# Patient Record
Sex: MALE
[De-identification: names, ages, dates, MRNs, and addresses within clinical notes are randomized per-mention and may not be internally consistent; named-entity substitution may affect disease eponyms.]

---

## 2023-05-01 ENCOUNTER — APPOINTMENT (OUTPATIENT)
Dept: INTERNAL MEDICINE | Facility: CLINIC | Age: 73
End: 2023-05-01

## 2023-05-03 ENCOUNTER — NON-APPOINTMENT (OUTPATIENT)
Age: 73
End: 2023-05-03

## 2023-05-03 ENCOUNTER — APPOINTMENT (OUTPATIENT)
Dept: INTERNAL MEDICINE | Facility: CLINIC | Age: 73
End: 2023-05-03
Payer: MEDICARE

## 2023-05-03 VITALS
OXYGEN SATURATION: 100 % | TEMPERATURE: 98.2 F | HEIGHT: 70 IN | WEIGHT: 163 LBS | BODY MASS INDEX: 23.34 KG/M2 | HEART RATE: 68 BPM

## 2023-05-03 VITALS — SYSTOLIC BLOOD PRESSURE: 122 MMHG | DIASTOLIC BLOOD PRESSURE: 72 MMHG

## 2023-05-03 DIAGNOSIS — I50.20 UNSPECIFIED SYSTOLIC (CONGESTIVE) HEART FAILURE: ICD-10-CM

## 2023-05-03 DIAGNOSIS — Z95.0 PRESENCE OF CARDIAC PACEMAKER: ICD-10-CM

## 2023-05-03 DIAGNOSIS — K27.9 PEPTIC ULCER, SITE UNSPECIFIED, UNSPECIFIED AS ACUTE OR CHRONIC, W/OUT HEMORRHAGE OR PERFORATION: ICD-10-CM

## 2023-05-03 DIAGNOSIS — Z95.810 PRESENCE OF AUTOMATIC (IMPLANTABLE) CARDIAC DEFIBRILLATOR: ICD-10-CM

## 2023-05-03 DIAGNOSIS — Z00.00 ENCOUNTER FOR GENERAL ADULT MEDICAL EXAMINATION W/OUT ABNORMAL FINDINGS: ICD-10-CM

## 2023-05-03 PROCEDURE — 36415 COLL VENOUS BLD VENIPUNCTURE: CPT

## 2023-05-03 PROCEDURE — 99213 OFFICE O/P EST LOW 20 MIN: CPT | Mod: 25

## 2023-05-03 PROCEDURE — G0438: CPT

## 2023-05-03 NOTE — PHYSICAL EXAM
[Normal] : soft, non-tender, non-distended, no masses palpated, no HSM and normal bowel sounds [de-identified] : some rigidity, masked facies, LUE tremor

## 2023-05-03 NOTE — HISTORY OF PRESENT ILLNESS
[Family Member] : family member [FreeTextEntry1] : to establish\par seen with sister [de-identified] : seen with sister\par prior 20+py tobacco d/c 10/22\par ashd with stents, icd/ppm placement--Dr Monet[facility]\par electro--dr. Pina\par advised to see endo and neuro but didn’t do so--tremor\par had bleeding PUD 12/22--inpt\par brings labs in from 2/23-reviewed\par lives with girlfriend

## 2023-05-03 NOTE — HEALTH RISK ASSESSMENT
[Patient reported colonoscopy was normal] : Patient reported colonoscopy was normal [ColonoscopyDate] : 1/21

## 2023-05-04 LAB
ANION GAP SERPL CALC-SCNC: 16 MMOL/L
BUN SERPL-MCNC: 20 MG/DL
CALCIUM SERPL-MCNC: 9.6 MG/DL
CHLORIDE SERPL-SCNC: 100 MMOL/L
CO2 SERPL-SCNC: 21 MMOL/L
CREAT SERPL-MCNC: 0.98 MG/DL
EGFR: 83 ML/MIN/1.73M2
ESTIMATED AVERAGE GLUCOSE: 240 MG/DL
GLUCOSE SERPL-MCNC: 247 MG/DL
HBA1C MFR BLD HPLC: 10 %
HCT VFR BLD CALC: 45.6 %
HCV AB SER QL: NONREACTIVE
HCV S/CO RATIO: 0.09 S/CO
HGB BLD-MCNC: 14.7 G/DL
MCHC RBC-ENTMCNC: 28.2 PG
MCHC RBC-ENTMCNC: 32.2 GM/DL
MCV RBC AUTO: 87.4 FL
PLATELET # BLD AUTO: 218 K/UL
POTASSIUM SERPL-SCNC: 4 MMOL/L
RBC # BLD: 5.22 M/UL
RBC # FLD: 15 %
SODIUM SERPL-SCNC: 138 MMOL/L
TSH SERPL-ACNC: 1.34 UIU/ML
WBC # FLD AUTO: 7.14 K/UL

## 2023-05-10 ENCOUNTER — NON-APPOINTMENT (OUTPATIENT)
Age: 73
End: 2023-05-10

## 2023-05-24 ENCOUNTER — APPOINTMENT (OUTPATIENT)
Dept: NEUROLOGY | Facility: CLINIC | Age: 73
End: 2023-05-24
Payer: MEDICARE

## 2023-05-24 VITALS
BODY MASS INDEX: 22.33 KG/M2 | DIASTOLIC BLOOD PRESSURE: 78 MMHG | WEIGHT: 156 LBS | HEART RATE: 70 BPM | HEIGHT: 70 IN | RESPIRATION RATE: 15 BRPM | OXYGEN SATURATION: 100 % | SYSTOLIC BLOOD PRESSURE: 108 MMHG | TEMPERATURE: 98 F

## 2023-05-24 PROCEDURE — 99205 OFFICE O/P NEW HI 60 MIN: CPT

## 2023-05-24 RX ORDER — LISINOPRIL 10 MG/1
10 TABLET ORAL
Refills: 0 | Status: ACTIVE | COMMUNITY

## 2023-05-24 RX ORDER — ATORVASTATIN CALCIUM 40 MG/1
40 TABLET, FILM COATED ORAL
Refills: 0 | Status: ACTIVE | COMMUNITY

## 2023-05-24 RX ORDER — CLOPIDOGREL BISULFATE 75 MG/1
75 TABLET, FILM COATED ORAL
Refills: 0 | Status: ACTIVE | COMMUNITY

## 2023-05-24 RX ORDER — AMLODIPINE BESYLATE 5 MG/1
5 TABLET ORAL
Refills: 0 | Status: ACTIVE | COMMUNITY

## 2023-05-24 RX ORDER — METOPROLOL TARTRATE 100 MG/1
100 TABLET, FILM COATED ORAL
Refills: 0 | Status: ACTIVE | COMMUNITY

## 2023-05-24 NOTE — HISTORY OF PRESENT ILLNESS
[FreeTextEntry1] : HPI (initial visit May 24, 2023)- NI FOX is a 72 year old left handed man w/ hx of CAD s/p stent, CHF s/p ICD, DM, HTN, Peptic ulcer disease, referred by Dr. Adrian contreras for tremor evaluation and to rule out PD. \par \par He is accompanied by sister. He lives with girlfriend, Sejal. \par \par Tremors in hands since at least . Left hand tremors at rest. Milder right hand tremors.  More noticeable after PM placement. Hand shakes with activities too. \par Balance has been off for the past few months. no falls. "Has to think to stand up". "Does not come naturally". Does better when he has seats with handles.\par Shuffles gait- x 1 year. + Festinating gait.\par Mumbles- sister attributes this to loosing his teeth.\par No constipation.\par Short term memory loss.\par Sense of smell is good.\par No hx of dream enactment. No vivid dreams.\par No head trauma or seizures. No hx of anti psychotic therapy. \par No hx of anxiety or depression. \par \par No known family hx of PD, Tremors or dementia. Parents  in their 60's (father had emphysema and mother had breast cancer). Sister had rectal cancer. \par

## 2023-05-24 NOTE — ASSESSMENT
[FreeTextEntry1] : Assessment/Plan:\par  72 year old left handed male w/ hx of resting tremors since 2016 (L>R) and shuffling gait with gait imbalance since 2022, referred for evaluation of possible Parkinsons disease. \par \par His clinical history and neurological exam are suggestive of idiopathic Parkinson disease. \par Counselled both patient and sister on the diagnosis, its pathophysiology and management. \par I will plan on starting him on low dose sinemet, reviewed side effects in detail.\par \par Sinemet  1 tab TID- titration schedule discussed. \par - 1 tab in evening x 1 week, then 1 tab noon and evening x 1 week and then 1 tab TID.\par \par Fall precautions discussed\par \par \par Return to clinic 2 months, will also refer to movement disorder specialist at [facility]. \par \par The above plan was discussed with NI FOX in great detail.  NI FOX verbalized understanding and agrees with plan as detailed above. Patient was provided education and counselling on current diagnosis/symptoms. He was advised to call our clinic at [phone] for any new or worsening symptoms, or with any questions or concerns. In case of acute onset of neurological symptoms or worsening presentation, patient was advised to present to nearest emergency room for further evaluation. NI FOX expressed understanding and all his questions/concerns were addressed.\par \par Lakisha Abdalla M.D\par

## 2023-05-24 NOTE — PHYSICAL EXAM
[FreeTextEntry1] : NEUROLOGICAL EXAM\par Mental status: The patient is alert, attentive, and oriented x 3.\par Speech: clear and fluent with good repetition, comprehension, and naming. Recalls 1/3 objects at 5 minutes.\par Cranial nerves:\par CN II: Visual fields are full to confrontation. Pupil size equal and briskly reactive to light. \par CN III, IV, VI: EOMI, no nystagmus, no ptosis\par CN V: Facial sensation is intact to pinprick in all 3 divisions bilaterally.\par CN VII: Face is symmetric with normal eye closure and smile.\par CN VII: Hearing is normal to rubbing fingers\par CN IX, X: Palate elevates symmetrically. Phonation is normal.\par CN XI: Head turning and shoulder shrug are intact\par CN XII: Tongue is midline with normal movements and no atrophy.\par Motor: There is no pronator drift of out-stretched arms. Muscle bulk. Strength is full bilaterally. \par 5/5 muscle power at bilat: Deltoid, Biceps, Triceps, Wrist ext, Finger abd, Hip flex, Hip ext, Knee flex, Knee ext, Ankle flex, Ankle ext\par Sensory: Light touch sensation intact UE and LE\par \par \par Movement disorders examination:\par Myerson sign: present\par Palmomental signs: present\par Speech and facial expression: + Hypomimia, + hypophonia \par Tremor: resting tremor b/l (pill rolling tremor), L>R. Mild postural and action tremors. \par Tone: mild b/l cogwheel rigidity at wrist\par Bradykinesia: Mildly noting on finger tapping on the right\par Gait: arm swing decreased on the right, mild shuffling gait, left hand pill rolling tremor, mild imbalance on turning. \par \par \par

## 2023-06-20 ENCOUNTER — APPOINTMENT (OUTPATIENT)
Dept: NEUROLOGY | Facility: CLINIC | Age: 73
End: 2023-06-20

## 2023-06-20 ENCOUNTER — APPOINTMENT (OUTPATIENT)
Dept: NEUROLOGY | Facility: CLINIC | Age: 73
End: 2023-06-20
Payer: MEDICARE

## 2023-06-20 PROCEDURE — 99215 OFFICE O/P EST HI 40 MIN: CPT

## 2023-06-20 RX ORDER — RASAGILINE 1 MG/1
1 TABLET ORAL DAILY
Qty: 90 | Refills: 3 | Status: ACTIVE | COMMUNITY
Start: 2023-06-20 | End: 1900-01-01

## 2023-06-20 RX ORDER — ASPIRIN 81 MG
81 TABLET, DELAYED RELEASE (ENTERIC COATED) ORAL
Refills: 0 | Status: DISCONTINUED | COMMUNITY
End: 2023-06-20

## 2023-06-21 NOTE — DISCUSSION/SUMMARY
[FreeTextEntry1] : 72 year old left handed male history of resting tremors since 2016 (L>R) and shuffling gait with gait imbalance since 2022. Patient started LD about three weeks ago, denies adverse effects but does not notice shuffling gait since starting the medication. On exam he is bradykinetic in his hand movements. He reportedly maintains independence with ALDs and does not fall. \par  \par Patient was counseled on the following recommendations\par \par -Start Rasagiline 1mg qd. AEs reviewed\par -Continue Sinemet 1 tab TID\par -Refer to PT \par -Encouraged to increase exercise and physical activity and maintain an active social and intellectual life.\par \par fu in 6 weeks with me \par f/u with movement disorders specialist

## 2023-06-21 NOTE — PHYSICAL EXAM
[FreeTextEntry1] : + 1 Facial hypomimia, reduced blink rate\par Vertical eye movements intact without square wave jerks\par +1 Hypophonic speech\par +1 Rigidity of neck rotation\par +2 L>R Rigidity of limbs present with contralateral activation \par +2 left arm resting tremor\par +1 left action tremor\par 0 Postural tremor \par +2 Bradykinesia with finger tapping, hand supination/pronation, foot tapping, foot stomping.\par No dysmetria with finger to nose\par Gait: able to stand with hands crossed and without assistance slowly\par Flexed posture\par Slow gait initiation, decreased stride length, reduced arm swing, NO freezing of gait upon turning, requires two steps with turning.\par Able to walk on heels and toes\par Negative retropulsion test\par \par

## 2023-07-04 ENCOUNTER — FORM ENCOUNTER (OUTPATIENT)
Age: 73
End: 2023-07-04

## 2023-07-12 ENCOUNTER — APPOINTMENT (OUTPATIENT)
Dept: NEUROLOGY | Facility: CLINIC | Age: 73
End: 2023-07-12
Payer: MEDICARE

## 2023-07-12 VITALS
WEIGHT: 156 LBS | HEART RATE: 79 BPM | HEIGHT: 70 IN | DIASTOLIC BLOOD PRESSURE: 78 MMHG | BODY MASS INDEX: 22.33 KG/M2 | OXYGEN SATURATION: 99 % | SYSTOLIC BLOOD PRESSURE: 110 MMHG

## 2023-07-12 PROCEDURE — 99214 OFFICE O/P EST MOD 30 MIN: CPT

## 2023-07-12 NOTE — HISTORY OF PRESENT ILLNESS
[FreeTextEntry1] : INTERIM HX 2023: He is on sinemet 1 tab TID. Seen in movement disorder order clinic 2023 and started on rasagiline 1mg QD, just started yesterday, and referred to PT. \par No side effects on Sinemet. Tremors are better and walking faster. When getting up out of chair he feels more comfortable, "no longer unsure", feels more coordinated. He has not been paying attention to timing of symptom improvement after taking meds. \par Sinemet 8 am, 1 pm, 5 pm. \par ----------------------------------------------------------------------------------------------------------------------------------------\par HPI (initial visit May 24, 2023)- NI FOX is a 72 year old left handed man w/ hx of CAD s/p stent, CHF s/p ICD, DM, HTN, Peptic ulcer disease, referred by Dr. Adrian contreras for tremor evaluation and to rule out PD. \par \par He is accompanied by sister. He lives with girlfriend, Sejal. \par \par Tremors in hands since at least 2016. Left hand tremors at rest. Milder right hand tremors.  More noticeable after PM placement. Hand shakes with activities too. \par Balance has been off for the past few months. no falls. "Has to think to stand up". "Does not come naturally". Does better when he has seats with handles.\par Shuffles gait- x 1 year. + Festinating gait.\par Mumbles- sister attributes this to loosing his teeth.\par No constipation.\par Short term memory loss.\par Sense of smell is good.\par No hx of dream enactment. No vivid dreams.\par No head trauma or seizures. No hx of anti psychotic therapy. \par No hx of anxiety or depression. \par \par No known family hx of PD, Tremors or dementia. Parents  in their 60's (father had emphysema and mother had breast cancer). Sister had rectal cancer. \par

## 2023-07-12 NOTE — ASSESSMENT
[FreeTextEntry1] : Assessment/Plan:\par  72 year old left handed male w/ hx of resting tremors since 2016 (L>R) and shuffling gait with gait imbalance since 2022, recently now diagnosed with idiopathic Parkinson disease 5/2023.\par \par He was started on Sinemet after our last visit, 5/24/2023, and was started on rasagaline 1 mg QD 7/11/2023 by Fawn Giordano NP. \par \par Gait has improved, tremors with mild improvement. \par \par Continue Sinemet  1 tab TID and rasagiline 1mg QD\par Agree with PT referral\par Fall precautions discussed\par \par Continue to follow up with movement disorder clinic, scheduled to see Fawn Giordano 8/1/2023\par \par The above plan was discussed with NI FOX in great detail. NI FOX verbalized understanding and agrees with plan as detailed above. Patient was provided education and counselling on current diagnosis/symptoms. He was advised to call our clinic at [phone] for any new or worsening symptoms, or with any questions or concerns. In case of acute onset of neurological symptoms or worsening presentation, patient was advised to present to nearest emergency room for further evaluation. NI FOX expressed understanding and all his questions/concerns were addressed.\par \par Lakisha Abdalla M.D

## 2023-07-12 NOTE — PHYSICAL EXAM
[FreeTextEntry1] : Myerson sign: present\par Palmomental signs: present\par Alert, attentive, and oriented x 3.\par + Hypomimia, + hypophonia \par EOMI, no nystagmus, no ptosis\par There is no pronator drift of out-stretched arms. Muscle bulk. Strength is full bilaterally. \par Resting tremor b/l (pill rolling tremor), L>R. Mild postural and action tremors. \par mild b/l cogwheel rigidity at wrist\par Mildly noting on finger tapping on the L>R\par No shuffling gait noted. Mild decreased arm swing R. Re emergent tremors\par \par

## 2023-08-01 ENCOUNTER — APPOINTMENT (OUTPATIENT)
Dept: NEUROLOGY | Facility: CLINIC | Age: 73
End: 2023-08-01
Payer: MEDICARE

## 2023-08-01 VITALS
SYSTOLIC BLOOD PRESSURE: 110 MMHG | HEIGHT: 70 IN | WEIGHT: 164 LBS | BODY MASS INDEX: 23.48 KG/M2 | HEART RATE: 53 BPM | DIASTOLIC BLOOD PRESSURE: 73 MMHG

## 2023-08-01 PROCEDURE — 99215 OFFICE O/P EST HI 40 MIN: CPT

## 2023-08-01 NOTE — DISCUSSION/SUMMARY
[FreeTextEntry1] : 72-year-old left handed male with history of resting tremors since 2016 (L>R) and shuffling gait with gait imbalance since 2022. Patient continues to be independent with ADLs but desires better tremor control.    Patient was counseled on the following recommendations:   -Continue rasagaline -increase Sinemet to 1.5 tabs TID -continue PT  -Encouraged to increase exercise and physical activity and maintain an active social and intellectual life.  fu in 2 months f/u with movement disorders specialist

## 2023-08-01 NOTE — HISTORY OF PRESENT ILLNESS
[FreeTextEntry1] : 72 year old   male patient was diagnosed with Parkinson's disease in 2023. Reports a rest tremor more on the left.  He reports some shuffling gait and slow to initiate gait. Does not need assistance with ADLs. Lately has not been driving because he has some visual issues. Denies falls, denies feeling lightheaded.    Since the last visit he experienced delays in obtaining rasagaline due to insurance issues. He has only been taking it for 2-3 weeks now. He reports trouble getting out of a deep chair. He does not need assistance with anything. He feels his gait has improved overall, no longer shuffles. He notices the right hand tremor most of the day and although it does not interfere with eating or self care, he finds it annoying. He started PT, now going 2x per week.  -No Anosmia -Sleeps about 8 hours a night. No RBD symptoms -BM qod  -Denies urinary issues  -No cognitive changes -Denies VH -No sialorrhea  Social Hx: retired . Lives with wife in [facility]. no children. Quit smoking in October 2022. Smoked for 50 years. PMHx: Pacemaker and defibrillator originally placed in 2016, DMII  Current medications: Carbidopa-Levodopa 25/100 8-1-6 Rasagaline 1mg qd

## 2023-08-01 NOTE — PHYSICAL EXAM
[General Appearance - Alert] : alert [General Appearance - In No Acute Distress] : in no acute distress [Oriented To Time, Place, And Person] : oriented to person, place, and time [Motor Strength] : muscle strength was normal in all four extremities [FreeTextEntry1] : + 1 Facial hypomimia, reduced blink rate Vertical eye movements intact without square wave jerks +1 Hypophonic speech +1 Rigidity of neck rotation +2 L>R Rigidity of limbs present  +2 left arm resting tremor +1 left action tremor 0 Postural tremor  +2 Bradykinesia with finger tapping, hand supination/pronation, foot tapping, foot stomping. No dysmetria with finger to nose Gait: able to stand with hands crossed and without assistance after 2 tries  Flexed posture Amanda gait initiation, normal stride length, reduced left arm swing, NO freezing of gait upon turning, requires two steps with turning. Able to walk on heels and toes Negative retropulsion test

## 2023-08-15 ENCOUNTER — RX RENEWAL (OUTPATIENT)
Age: 73
End: 2023-08-15

## 2023-08-31 ENCOUNTER — APPOINTMENT (OUTPATIENT)
Dept: INTERNAL MEDICINE | Facility: CLINIC | Age: 73
End: 2023-08-31
Payer: MEDICARE

## 2023-08-31 VITALS
SYSTOLIC BLOOD PRESSURE: 116 MMHG | TEMPERATURE: 98.6 F | OXYGEN SATURATION: 77 % | HEART RATE: 222 BPM | HEIGHT: 70 IN | DIASTOLIC BLOOD PRESSURE: 73 MMHG | WEIGHT: 162 LBS | BODY MASS INDEX: 23.19 KG/M2

## 2023-08-31 DIAGNOSIS — R25.1 TREMOR, UNSPECIFIED: ICD-10-CM

## 2023-08-31 PROCEDURE — 99214 OFFICE O/P EST MOD 30 MIN: CPT

## 2023-08-31 RX ORDER — BLOOD SUGAR DIAGNOSTIC
STRIP MISCELLANEOUS
Refills: 0 | Status: ACTIVE | COMMUNITY
Start: 2023-08-31

## 2023-08-31 RX ORDER — BLOOD-GLUCOSE METER
W/DEVICE EACH MISCELLANEOUS
Qty: 1 | Refills: 0 | Status: COMPLETED | COMMUNITY
Start: 2023-05-11 | End: 2023-08-31

## 2023-08-31 RX ORDER — LEVMETAMFETAMINE 50 MG
INHALER (EA) NASAL
Qty: 1 | Refills: 3 | Status: ACTIVE | COMMUNITY
Start: 2023-08-31 | End: 1900-01-01

## 2023-08-31 NOTE — HISTORY OF PRESENT ILLNESS
[Family Member] : family member [FreeTextEntry1] : f/u diabetes [de-identified] : seeing cardio ([facility]) meds same--movement better on parkinsons meds not checking glucoses consultant notes reviewed

## 2023-09-01 LAB
ALBUMIN SERPL ELPH-MCNC: 4.6 G/DL
ALP BLD-CCNC: 111 U/L
ALT SERPL-CCNC: 7 U/L
ANION GAP SERPL CALC-SCNC: 14 MMOL/L
AST SERPL-CCNC: 15 U/L
BILIRUB SERPL-MCNC: 1.1 MG/DL
BUN SERPL-MCNC: 20 MG/DL
CALCIUM SERPL-MCNC: 9.9 MG/DL
CHLORIDE SERPL-SCNC: 103 MMOL/L
CHOLEST SERPL-MCNC: 201 MG/DL
CO2 SERPL-SCNC: 23 MMOL/L
CREAT SERPL-MCNC: 0.95 MG/DL
EGFR: 86 ML/MIN/1.73M2
ESTIMATED AVERAGE GLUCOSE: 180 MG/DL
GLUCOSE SERPL-MCNC: 156 MG/DL
HBA1C MFR BLD HPLC: 7.9 %
HDLC SERPL-MCNC: 67 MG/DL
LDLC SERPL CALC-MCNC: 113 MG/DL
NONHDLC SERPL-MCNC: 134 MG/DL
POTASSIUM SERPL-SCNC: 4.4 MMOL/L
PROT SERPL-MCNC: 7.2 G/DL
SODIUM SERPL-SCNC: 140 MMOL/L
TRIGL SERPL-MCNC: 120 MG/DL

## 2023-10-03 ENCOUNTER — APPOINTMENT (OUTPATIENT)
Dept: NEUROLOGY | Facility: CLINIC | Age: 73
End: 2023-10-03
Payer: MEDICARE

## 2023-10-03 VITALS
DIASTOLIC BLOOD PRESSURE: 59 MMHG | HEIGHT: 70 IN | BODY MASS INDEX: 23.19 KG/M2 | HEART RATE: 43 BPM | SYSTOLIC BLOOD PRESSURE: 96 MMHG | WEIGHT: 162 LBS

## 2023-10-03 VITALS — HEART RATE: 57 BPM | SYSTOLIC BLOOD PRESSURE: 89 MMHG | DIASTOLIC BLOOD PRESSURE: 59 MMHG

## 2023-10-03 PROCEDURE — 99215 OFFICE O/P EST HI 40 MIN: CPT

## 2023-12-08 ENCOUNTER — APPOINTMENT (OUTPATIENT)
Dept: NEUROLOGY | Facility: CLINIC | Age: 73
End: 2023-12-08
Payer: MEDICARE

## 2023-12-08 VITALS
HEIGHT: 70 IN | WEIGHT: 162 LBS | HEART RATE: 51 BPM | BODY MASS INDEX: 23.19 KG/M2 | DIASTOLIC BLOOD PRESSURE: 73 MMHG | SYSTOLIC BLOOD PRESSURE: 125 MMHG

## 2023-12-08 DIAGNOSIS — K59.00 CONSTIPATION, UNSPECIFIED: ICD-10-CM

## 2023-12-08 PROCEDURE — 99215 OFFICE O/P EST HI 40 MIN: CPT

## 2023-12-08 RX ORDER — SENNOSIDES 8.6 MG TABLETS 8.6 MG/1
8.6 TABLET ORAL
Qty: 60 | Refills: 5 | Status: ACTIVE | COMMUNITY
Start: 2023-12-08 | End: 1900-01-01

## 2024-01-23 ENCOUNTER — APPOINTMENT (OUTPATIENT)
Dept: INTERNAL MEDICINE | Facility: CLINIC | Age: 74
End: 2024-01-23
Payer: MEDICARE

## 2024-01-23 VITALS
TEMPERATURE: 97 F | BODY MASS INDEX: 23.34 KG/M2 | OXYGEN SATURATION: 54 % | DIASTOLIC BLOOD PRESSURE: 51 MMHG | WEIGHT: 163 LBS | SYSTOLIC BLOOD PRESSURE: 100 MMHG | HEART RATE: 213 BPM | HEIGHT: 70 IN

## 2024-01-23 VITALS — DIASTOLIC BLOOD PRESSURE: 74 MMHG | SYSTOLIC BLOOD PRESSURE: 118 MMHG

## 2024-01-23 VITALS — HEART RATE: 71 BPM | OXYGEN SATURATION: 100 %

## 2024-01-23 DIAGNOSIS — G20.A1 PARKINSON'S DISEASE WITHOUT DYSKINESIA, WITHOUT MENTION OF FLUCTUATIONS: ICD-10-CM

## 2024-01-23 DIAGNOSIS — F17.201 NICOTINE DEPENDENCE, UNSPECIFIED, IN REMISSION: ICD-10-CM

## 2024-01-23 DIAGNOSIS — I10 ESSENTIAL (PRIMARY) HYPERTENSION: ICD-10-CM

## 2024-01-23 DIAGNOSIS — E11.9 TYPE 2 DIABETES MELLITUS W/OUT COMPLICATIONS: ICD-10-CM

## 2024-01-23 DIAGNOSIS — I25.10 ATHEROSCLEROTIC HEART DISEASE OF NATIVE CORONARY ARTERY W/OUT ANGINA PECTORIS: ICD-10-CM

## 2024-01-23 DIAGNOSIS — Z95.5 PRESENCE OF CORONARY ANGIOPLASTY IMPLANT AND GRAFT: ICD-10-CM

## 2024-01-23 PROCEDURE — G0296 VISIT TO DETERM LDCT ELIG: CPT

## 2024-01-23 PROCEDURE — 99214 OFFICE O/P EST MOD 30 MIN: CPT

## 2024-01-23 NOTE — HISTORY OF PRESENT ILLNESS
[Family Member] : family member [FreeTextEntry1] : seen with sister [de-identified] : stable seeing cardio ([facility]--[facility]) no new cardio--pulm symps as per pt and sister same meds

## 2024-01-24 LAB
ALBUMIN SERPL ELPH-MCNC: 4.2 G/DL
ALP BLD-CCNC: 115 U/L
ALT SERPL-CCNC: 13 U/L
ANION GAP SERPL CALC-SCNC: 11 MMOL/L
AST SERPL-CCNC: 18 U/L
BASOPHILS # BLD AUTO: 0.05 K/UL
BASOPHILS NFR BLD AUTO: 0.7 %
BILIRUB SERPL-MCNC: 0.9 MG/DL
BUN SERPL-MCNC: 19 MG/DL
CALCIUM SERPL-MCNC: 9 MG/DL
CHLORIDE SERPL-SCNC: 102 MMOL/L
CO2 SERPL-SCNC: 26 MMOL/L
CREAT SERPL-MCNC: 0.83 MG/DL
EGFR: 94 ML/MIN/1.73M2
EOSINOPHIL # BLD AUTO: 0.11 K/UL
EOSINOPHIL NFR BLD AUTO: 1.6 %
ESTIMATED AVERAGE GLUCOSE: 180 MG/DL
GLUCOSE SERPL-MCNC: 319 MG/DL
HBA1C MFR BLD HPLC: 7.9 %
HCT VFR BLD CALC: 47.2 %
HGB BLD-MCNC: 15.4 G/DL
IMM GRANULOCYTES NFR BLD AUTO: 0.6 %
LYMPHOCYTES # BLD AUTO: 2.26 K/UL
LYMPHOCYTES NFR BLD AUTO: 32.4 %
MAN DIFF?: NORMAL
MCHC RBC-ENTMCNC: 30.1 PG
MCHC RBC-ENTMCNC: 32.6 GM/DL
MCV RBC AUTO: 92.4 FL
MONOCYTES # BLD AUTO: 0.48 K/UL
MONOCYTES NFR BLD AUTO: 6.9 %
NEUTROPHILS # BLD AUTO: 4.04 K/UL
NEUTROPHILS NFR BLD AUTO: 57.8 %
PLATELET # BLD AUTO: 223 K/UL
POTASSIUM SERPL-SCNC: 3.8 MMOL/L
PROT SERPL-MCNC: 6.9 G/DL
RBC # BLD: 5.11 M/UL
RBC # FLD: 14.6 %
SODIUM SERPL-SCNC: 139 MMOL/L
WBC # FLD AUTO: 6.98 K/UL

## 2024-01-26 RX ORDER — METFORMIN ER 500 MG 500 MG/1
500 TABLET ORAL
Qty: 90 | Refills: 1 | Status: ACTIVE | COMMUNITY
Start: 2024-01-26 | End: 1900-01-01

## 2024-02-01 ENCOUNTER — NON-APPOINTMENT (OUTPATIENT)
Age: 74
End: 2024-02-01

## 2024-02-01 VITALS — BODY MASS INDEX: 23.77 KG/M2 | HEIGHT: 70 IN | WEIGHT: 166 LBS

## 2024-02-01 DIAGNOSIS — Z87.891 PERSONAL HISTORY OF NICOTINE DEPENDENCE: ICD-10-CM

## 2024-02-01 NOTE — REASON FOR VISIT
[Medical Office: ([facility])___] : at the medical office located in  [Verbal consent obtained from patient] : the patient, [unfilled] [Initial Evaluation] : an initial evaluation visit [Review of Eligibility] : review of eligibility [Low-Dose CT Screening Discussion] : low-dose CT lung cancer screening discussion [Virtual Visit] : virtual visit

## 2024-02-01 NOTE — HISTORY OF PRESENT ILLNESS
[Former] : Former [TextBox_13] : Referred by Dr. Mckinney  Mr. NI FOX is a 73 year old man with a history of nicotine dependence   Over the telephone today we reviewed and confirmed that the patient meets screening eligibility criteria:  -Age: 73 years old  Smoking status:  -Former smoker  -Number of pack(s) per day: 2  -Number of years smoked: 45  -Number of pack years smokin  -Number of years since quitting smokin  Mr. FOX denies any personal history of lung cancer. Denies any s/s of lung cancer. No lung cancer in a 1st degree relative. Denies any history of lung disease. Denies any history of occupational exposures [YearQuit] : 2023 [PacksperDay] : 2 [N_Years] : 45 [PacksperYear] : 90

## 2024-02-02 ENCOUNTER — OUTPATIENT (OUTPATIENT)
Dept: OUTPATIENT SERVICES | Facility: HOSPITAL | Age: 74
LOS: 1 days | End: 2024-02-02
Payer: MEDICARE

## 2024-02-02 ENCOUNTER — APPOINTMENT (OUTPATIENT)
Dept: CT IMAGING | Facility: CLINIC | Age: 74
End: 2024-02-02

## 2024-02-02 ENCOUNTER — RESULT REVIEW (OUTPATIENT)
Age: 74
End: 2024-02-02

## 2024-02-02 DIAGNOSIS — F17.201 NICOTINE DEPENDENCE, UNSPECIFIED, IN REMISSION: ICD-10-CM

## 2024-02-02 PROCEDURE — 71271 CT THORAX LUNG CANCER SCR C-: CPT | Mod: 26

## 2024-02-02 PROCEDURE — 71271 CT THORAX LUNG CANCER SCR C-: CPT

## 2024-02-06 ENCOUNTER — NON-APPOINTMENT (OUTPATIENT)
Age: 74
End: 2024-02-06

## 2024-02-26 ENCOUNTER — RX RENEWAL (OUTPATIENT)
Age: 74
End: 2024-02-26

## 2024-02-26 RX ORDER — CARBIDOPA AND LEVODOPA 25; 100 MG/1; MG/1
25-100 TABLET ORAL
Qty: 135 | Refills: 5 | Status: ACTIVE | COMMUNITY
Start: 2023-05-24 | End: 1900-01-01

## 2024-02-26 RX ORDER — EMPAGLIFLOZIN 25 MG/1
25 TABLET, FILM COATED ORAL DAILY
Qty: 90 | Refills: 1 | Status: ACTIVE | COMMUNITY
Start: 2023-05-11 | End: 1900-01-01

## 2024-03-12 ENCOUNTER — APPOINTMENT (OUTPATIENT)
Dept: NEUROLOGY | Facility: CLINIC | Age: 74
End: 2024-03-12

## 2024-06-25 ENCOUNTER — APPOINTMENT (OUTPATIENT)
Dept: NEUROLOGY | Facility: CLINIC | Age: 74
End: 2024-06-25